# Patient Record
Sex: MALE | Race: BLACK OR AFRICAN AMERICAN
[De-identification: names, ages, dates, MRNs, and addresses within clinical notes are randomized per-mention and may not be internally consistent; named-entity substitution may affect disease eponyms.]

---

## 2018-12-10 ENCOUNTER — HOSPITAL ENCOUNTER (OUTPATIENT)
Dept: HOSPITAL 47 - EC | Age: 38
Setting detail: OBSERVATION
LOS: 1 days | Discharge: HOME | End: 2018-12-11
Attending: FAMILY MEDICINE | Admitting: FAMILY MEDICINE
Payer: COMMERCIAL

## 2018-12-10 DIAGNOSIS — E66.9: ICD-10-CM

## 2018-12-10 DIAGNOSIS — R42: ICD-10-CM

## 2018-12-10 DIAGNOSIS — R07.89: Primary | ICD-10-CM

## 2018-12-10 LAB
ALBUMIN SERPL-MCNC: 4.5 G/DL (ref 3.5–5)
ALP SERPL-CCNC: 42 U/L (ref 38–126)
ALT SERPL-CCNC: 35 U/L (ref 21–72)
ANION GAP SERPL CALC-SCNC: 10 MMOL/L
APTT BLD: 28.6 SEC (ref 22–30)
AST SERPL-CCNC: 33 U/L (ref 17–59)
BASOPHILS # BLD AUTO: 0 K/UL (ref 0–0.2)
BASOPHILS NFR BLD AUTO: 0 %
BUN SERPL-SCNC: 18 MG/DL (ref 9–20)
CALCIUM SPEC-MCNC: 9.6 MG/DL (ref 8.4–10.2)
CHLORIDE SERPL-SCNC: 102 MMOL/L (ref 98–107)
CK SERPL-CCNC: 813 U/L (ref 55–170)
CO2 SERPL-SCNC: 27 MMOL/L (ref 22–30)
D DIMER PPP FEU-MCNC: 0.27 MG/L FEU (ref ?–0.6)
EOSINOPHIL # BLD AUTO: 0.4 K/UL (ref 0–0.7)
EOSINOPHIL NFR BLD AUTO: 5 %
ERYTHROCYTE [DISTWIDTH] IN BLOOD BY AUTOMATED COUNT: 4.62 M/UL (ref 4.3–5.9)
ERYTHROCYTE [DISTWIDTH] IN BLOOD: 13.2 % (ref 11.5–15.5)
GLUCOSE SERPL-MCNC: 103 MG/DL (ref 74–99)
HCT VFR BLD AUTO: 42 % (ref 39–53)
HGB BLD-MCNC: 13.6 GM/DL (ref 13–17.5)
INR PPP: 0.9 (ref ?–1.2)
LYMPHOCYTES # SPEC AUTO: 3.2 K/UL (ref 1–4.8)
LYMPHOCYTES NFR SPEC AUTO: 36 %
MAGNESIUM SPEC-SCNC: 2 MG/DL (ref 1.6–2.3)
MCH RBC QN AUTO: 29.5 PG (ref 25–35)
MCHC RBC AUTO-ENTMCNC: 32.4 G/DL (ref 31–37)
MCV RBC AUTO: 91 FL (ref 80–100)
MONOCYTES # BLD AUTO: 0.6 K/UL (ref 0–1)
MONOCYTES NFR BLD AUTO: 6 %
NEUTROPHILS # BLD AUTO: 4.4 K/UL (ref 1.3–7.7)
NEUTROPHILS NFR BLD AUTO: 50 %
PLATELET # BLD AUTO: 332 K/UL (ref 150–450)
POTASSIUM SERPL-SCNC: 3.9 MMOL/L (ref 3.5–5.1)
PROT SERPL-MCNC: 7.7 G/DL (ref 6.3–8.2)
PT BLD: 9.7 SEC (ref 9–12)
SODIUM SERPL-SCNC: 139 MMOL/L (ref 137–145)
TROPONIN I SERPL-MCNC: <0.012 NG/ML (ref 0–0.03)
WBC # BLD AUTO: 8.8 K/UL (ref 3.8–10.6)

## 2018-12-10 PROCEDURE — 93306 TTE W/DOPPLER COMPLETE: CPT

## 2018-12-10 PROCEDURE — 36415 COLL VENOUS BLD VENIPUNCTURE: CPT

## 2018-12-10 PROCEDURE — 82553 CREATINE MB FRACTION: CPT

## 2018-12-10 PROCEDURE — 85379 FIBRIN DEGRADATION QUANT: CPT

## 2018-12-10 PROCEDURE — 85025 COMPLETE CBC W/AUTO DIFF WBC: CPT

## 2018-12-10 PROCEDURE — 82550 ASSAY OF CK (CPK): CPT

## 2018-12-10 PROCEDURE — 84484 ASSAY OF TROPONIN QUANT: CPT

## 2018-12-10 PROCEDURE — 85610 PROTHROMBIN TIME: CPT

## 2018-12-10 PROCEDURE — 83735 ASSAY OF MAGNESIUM: CPT

## 2018-12-10 PROCEDURE — 93005 ELECTROCARDIOGRAM TRACING: CPT

## 2018-12-10 PROCEDURE — 71046 X-RAY EXAM CHEST 2 VIEWS: CPT

## 2018-12-10 PROCEDURE — 80053 COMPREHEN METABOLIC PANEL: CPT

## 2018-12-10 PROCEDURE — 96360 HYDRATION IV INFUSION INIT: CPT

## 2018-12-10 PROCEDURE — 83880 ASSAY OF NATRIURETIC PEPTIDE: CPT

## 2018-12-10 PROCEDURE — 99285 EMERGENCY DEPT VISIT HI MDM: CPT

## 2018-12-10 PROCEDURE — 93351 STRESS TTE COMPLETE: CPT

## 2018-12-10 PROCEDURE — 85730 THROMBOPLASTIN TIME PARTIAL: CPT

## 2018-12-10 NOTE — XR
EXAMINATION TYPE: XR chest 2V

 

DATE OF EXAM: 12/10/2018

 

COMPARISON: NONE

 

HISTORY: Chest pain

 

TECHNIQUE:  Frontal and lateral views of the chest are obtained.

 

FINDINGS:  Heart and mediastinum are normal. Lungs are clear. Diaphragm is normal. Bony thorax appear
s normal.

 

IMPRESSION:  Normal chest.

## 2018-12-10 NOTE — ED
General Adult HPI





- General


Chief complaint: Chest Pain


Stated complaint: Chest tightness, Dizziness


Time Seen by Provider: 12/10/18 21:18


Source: patient, family, RN notes reviewed, old records reviewed


Mode of arrival: ambulatory


Limitations: no limitations





- History of Present Illness


Initial comments: 





38 -year-old male with no past medical history presents for evaluation of chest 

tightness and lightheadedness.  Patient states that he has been dealing with 

some upper anterior chest tightness which is intermittent over the past several 

months.  He was seen at an outside hospital and received an EKG.  Tonight while 

exercising he developed some upper chest tightness and became lightheaded.  

Denies nausea or vomiting.  Denies radiating chest pain.  Denies any headache 

or focal numbness or weakness.  No significant past medical history.  No 

history diabetes, nonsmoker.





- Related Data


 Home Medications











 Medication  Instructions  Recorded  Confirmed


 


No Known Home Medications  12/10/18 12/10/18











 Allergies











Allergy/AdvReac Type Severity Reaction Status Date / Time


 


No Known Allergies Allergy   Verified 12/10/18 21:31














Review of Systems


ROS Statement: 


Those systems with pertinent positive or pertinent negative responses have been 

documented in the HPI.





ROS Other: All systems not noted in ROS Statement are negative.





Past Medical History


Past Medical History: No Reported History


History of Any Multi-Drug Resistant Organisms: None Reported


Past Surgical History: Unable to Obtain


Past Psychological History: No Psychological Hx Reported


Smoking Status: Never smoker


Past Alcohol Use History: None Reported


Past Drug Use History: None Reported





General Exam


Limitations: no limitations


General appearance: alert, in no apparent distress


Head exam: Present: atraumatic, normocephalic


Eye exam: Present: normal appearance, PERRL, EOMI


ENT exam: Present: normal exam


Neck exam: Present: normal inspection.  Absent: tenderness, meningismus


Respiratory exam: Present: normal lung sounds bilaterally.  Absent: respiratory 

distress, wheezes


Cardiovascular Exam: Present: regular rate, normal rhythm


GI/Abdominal exam: Present: soft.  Absent: distended, tenderness, guarding


Extremities exam: Present: normal inspection, normal capillary refill.  Absent: 

pedal edema


Back exam: Present: normal inspection, full ROM


Neurological exam: Present: alert, oriented X3, CN II-XII intact.  Absent: 

motor sensory deficit


Psychiatric exam: Present: normal affect, normal mood


Skin exam: Present: warm, dry, intact.  Absent: cyanosis, diaphoretic





Course


 Vital Signs











  12/10/18 12/10/18 12/10/18





  21:13 22:00 22:51


 


Temperature 98.3 F  


 


Pulse Rate 83 74 77


 


Respiratory 20 16 15





Rate   


 


Blood Pressure 150/89 140/89 141/87


 


O2 Sat by Pulse 100 100 100





Oximetry   














EKG Findings





- EKG Comments:


EKG Findings:: EKG: Normal sinus rhythm, ventricular rate 71, DC interval 158, 

QRS duration 88, , there is Q-wave and T-wave inversion in lead 3, S-

wave in lead 1.  No ST segment changes.





Medical Decision Making





- Medical Decision Making





38 -year-old male presenting with intermittent chest tightness.  Patient was 

sent in by recommendations of his primary care physician.  Dr. Camargo is able to 

evaluate the patient in the emergency department.





EKG is normal sinus with Q-wave and T-wave inversion in lead 3, may be normal 

variant.  CBC, CMP are within normal limits, d-dimer is negative, initial 

troponin negative.  Patient will be admitted to Dr. Camargo.  Recommends echo and 

stress echo at this time.  Cardiology will be placed on consult.





- Lab Data


Result diagrams: 


 12/10/18 21:45





 12/10/18 21:45


 Lab Results











  12/10/18 12/10/18 12/10/18 Range/Units





  21:45 21:45 21:45 


 


WBC   8.8   (3.8-10.6)  k/uL


 


RBC   4.62   (4.30-5.90)  m/uL


 


Hgb   13.6   (13.0-17.5)  gm/dL


 


Hct   42.0   (39.0-53.0)  %


 


MCV   91.0   (80.0-100.0)  fL


 


MCH   29.5   (25.0-35.0)  pg


 


MCHC   32.4   (31.0-37.0)  g/dL


 


RDW   13.2   (11.5-15.5)  %


 


Plt Count   332   (150-450)  k/uL


 


Neutrophils %   50   %


 


Lymphocytes %   36   %


 


Monocytes %   6   %


 


Eosinophils %   5   %


 


Basophils %   0   %


 


Neutrophils #   4.4   (1.3-7.7)  k/uL


 


Lymphocytes #   3.2   (1.0-4.8)  k/uL


 


Monocytes #   0.6   (0-1.0)  k/uL


 


Eosinophils #   0.4   (0-0.7)  k/uL


 


Basophils #   0.0   (0-0.2)  k/uL


 


PT     (9.0-12.0)  sec


 


INR     (<1.2)  


 


APTT     (22.0-30.0)  sec


 


D-Dimer     (<0.60)  mg/L FEU


 


Sodium    139  (137-145)  mmol/L


 


Potassium    3.9  (3.5-5.1)  mmol/L


 


Chloride    102  ()  mmol/L


 


Carbon Dioxide    27  (22-30)  mmol/L


 


Anion Gap    10  mmol/L


 


BUN    18  (9-20)  mg/dL


 


Creatinine    1.11  (0.66-1.25)  mg/dL


 


Est GFR (CKD-EPI)AfAm    >90  (>60 ml/min/1.73 sqM)  


 


Est GFR (CKD-EPI)NonAf    85  (>60 ml/min/1.73 sqM)  


 


Glucose    103 H  (74-99)  mg/dL


 


Calcium    9.6  (8.4-10.2)  mg/dL


 


Magnesium    2.0  (1.6-2.3)  mg/dL


 


Total Bilirubin    0.2  (0.2-1.3)  mg/dL


 


AST    33  (17-59)  U/L


 


ALT    35  (21-72)  U/L


 


Alkaline Phosphatase    42  ()  U/L


 


Total Creatine Kinase  813 H    ()  U/L


 


CK-MB (CK-2)  4.5 H    (0.0-2.4)  ng/mL


 


CK-MB (CK-2) Rel Index  0.6    


 


Troponin I  <0.012    (0.000-0.034)  ng/mL


 


NT-Pro-B Natriuret Pep     pg/mL


 


Total Protein    7.7  (6.3-8.2)  g/dL


 


Albumin    4.5  (3.5-5.0)  g/dL














  12/10/18 12/10/18 Range/Units





  21:45 21:45 


 


WBC    (3.8-10.6)  k/uL


 


RBC    (4.30-5.90)  m/uL


 


Hgb    (13.0-17.5)  gm/dL


 


Hct    (39.0-53.0)  %


 


MCV    (80.0-100.0)  fL


 


MCH    (25.0-35.0)  pg


 


MCHC    (31.0-37.0)  g/dL


 


RDW    (11.5-15.5)  %


 


Plt Count    (150-450)  k/uL


 


Neutrophils %    %


 


Lymphocytes %    %


 


Monocytes %    %


 


Eosinophils %    %


 


Basophils %    %


 


Neutrophils #    (1.3-7.7)  k/uL


 


Lymphocytes #    (1.0-4.8)  k/uL


 


Monocytes #    (0-1.0)  k/uL


 


Eosinophils #    (0-0.7)  k/uL


 


Basophils #    (0-0.2)  k/uL


 


PT  9.7   (9.0-12.0)  sec


 


INR  0.9   (<1.2)  


 


APTT  28.6   (22.0-30.0)  sec


 


D-Dimer  0.27   (<0.60)  mg/L FEU


 


Sodium    (137-145)  mmol/L


 


Potassium    (3.5-5.1)  mmol/L


 


Chloride    ()  mmol/L


 


Carbon Dioxide    (22-30)  mmol/L


 


Anion Gap    mmol/L


 


BUN    (9-20)  mg/dL


 


Creatinine    (0.66-1.25)  mg/dL


 


Est GFR (CKD-EPI)AfAm    (>60 ml/min/1.73 sqM)  


 


Est GFR (CKD-EPI)NonAf    (>60 ml/min/1.73 sqM)  


 


Glucose    (74-99)  mg/dL


 


Calcium    (8.4-10.2)  mg/dL


 


Magnesium    (1.6-2.3)  mg/dL


 


Total Bilirubin    (0.2-1.3)  mg/dL


 


AST    (17-59)  U/L


 


ALT    (21-72)  U/L


 


Alkaline Phosphatase    ()  U/L


 


Total Creatine Kinase    ()  U/L


 


CK-MB (CK-2)    (0.0-2.4)  ng/mL


 


CK-MB (CK-2) Rel Index    


 


Troponin I    (0.000-0.034)  ng/mL


 


NT-Pro-B Natriuret Pep   13  pg/mL


 


Total Protein    (6.3-8.2)  g/dL


 


Albumin    (3.5-5.0)  g/dL














Disposition


Clinical Impression: 


 Chest pain





Disposition: ADMITTED AS IP TO THIS HOSP


Condition: Stable


Is patient prescribed a controlled substance at d/c from ED?: No


Referrals: 


Vick Camargo MD [Primary Care Provider] - 1-2 days


Decision to Admit Reason: Admit from EC


Decision Date: 12/10/18


Decision Time: 23:19

## 2018-12-11 VITALS — RESPIRATION RATE: 18 BRPM

## 2018-12-11 VITALS — TEMPERATURE: 98.5 F | HEART RATE: 69 BPM | SYSTOLIC BLOOD PRESSURE: 113 MMHG | DIASTOLIC BLOOD PRESSURE: 71 MMHG

## 2018-12-11 LAB
CK SERPL-CCNC: 737 U/L (ref 55–170)
TROPONIN I SERPL-MCNC: <0.012 NG/ML (ref 0–0.03)

## 2018-12-11 NOTE — P.DS
Providers


Date of admission: 


12/10/18 23:41





Expected date of discharge: 12/11/18


Attending physician: 


Vick Camargo





Consults: 





 





12/10/18 23:17


Consult Physician Routine 


   Consulting Provider: Navneet Ortiz


   Consult Reason/Comments: CP


   Do you want consulting provider notified?: Yes











Primary care physician: 


Vick Camargo





Hospital Course: 





38-year-old well-developed male was admitted to the emergency room for 

evaluation of chest pain.  Patient had a consultation with cardiology 

cardiology cleared for discharge with the negative stress test





Assessment


Chest pain atypical troponins negative stress test negative


Negative health history





Plan


Follow-up with family physician Dr. Vick Camargo


Patient Condition at Discharge: Stable





Plan - Discharge Summary


Discharge Rx Participant: No


New Discharge Prescriptions: 


No Action


   No Known Home Medications 


Discharge Medication List





No Known Home Medications  12/10/18 [History]








Follow up Appointment(s)/Referral(s): 


Vick Camargo MD [Primary Care Provider] - 1-2 days


Discharge Disposition: HOME SELF-CARE

## 2018-12-11 NOTE — ECHOS
STRESS ECHOCARDIOGRAM



DATE OF SERVICE:

12/11/2018



INDICATIONS:

Chest pain.



MEDICATIONS:



BASELINE HEART RATE:

68



BASELINE BLOOD PRESSURE:

131/70



MAXIMUM HEART RATE:

164



MAXIMUM BLOOD PRESSURE:

182/100



85% MPHR:

156



100% MPHR:

184



METS:

12.1



MAXIMUM STAGE REACHED:

IV



TOTAL EXERCISE TIME:

11 minutes



CLINICAL INFORMATION:

Baseline rhythm is sinus mechanism, rate 68, normal axis and intervals, RSR prime.

Baseline blood pressure 131/70 mmHg. Patient exercised on Pavan protocol for 11

minutes, reaching peak rate 164 beats per minute, which is equal to 89% maximum

predicted heart rate.  Peak blood pressure 182/100 mmHg. Test was terminated secondary

to fatigue. There was no chest pain. Electrocardiograph monitoring revealed no evidence

of diagnostic ischemic ST deviation.



Baseline echocardiogram revealed normal wall thickening and motion. At peak exercise,

there was normal wall motion augmentation with no hypokinesis or dyskinesis.



CONCLUSION:

1. Good exercise tolerance with normal electrocardiograph response to exercise.

2. Normal stress echocardiogram with no evidence of stress induced ischemia.





MMODL / IJN: [id number] / Job#: [id number]

## 2018-12-11 NOTE — P.HPIM
History of Present Illness





38-year-old male developed chest pain after working out in the gym.  Patient is 

a  also does heavy lifting with walking and lifting packages.  

Patient to have a cardiology evaluation





Review of Systems


Cardiovascular: Reports chest pain





Past Medical History


Past Medical History: No Reported History


History of Any Multi-Drug Resistant Organisms: None Reported


Past Surgical History: No Surgical Hx Reported


Additional Past Anesthesia/Blood Transfusion Reaction / Comment(s): Pt has 

never had anesthesia


Smoking Status: Never smoker





- Past Family History


  ** Father


Family Medical History: No Reported History


Additional Family Medical History / Comment(s): Healthy





  ** Mother


Family Medical History: No Reported History


Additional Family Medical History / Comment(s): Healthy





Medications and Allergies


 Home Medications











 Medication  Instructions  Recorded  Confirmed  Type


 


No Known Home Medications  12/10/18 12/10/18 History











 Allergies











Allergy/AdvReac Type Severity Reaction Status Date / Time


 


No Known Allergies Allergy   Verified 12/10/18 21:31














Physical Exam


Vitals: 


 Vital Signs











  Temp Pulse Pulse Resp BP BP Pulse Ox


 


 12/11/18 11:24  98.5 F   69  18   113/71  99


 


 12/11/18 07:30  97.5 F L   62  18   149/80  100


 


 12/11/18 05:00   60   17  120/79   97


 


 12/11/18 04:00   49 L   13  116/71   98


 


 12/11/18 03:00   53 L   13  120/63   97


 


 12/11/18 02:00   66   18  119/68   96


 


 12/11/18 01:00   66   16  125/65   97


 


 12/11/18 00:00   75   12  124/72   97


 


 12/10/18 23:56   74   18  124/72   97


 


 12/10/18 22:51   77   15  141/87   100


 


 12/10/18 22:00   74   16  140/89   100


 


 12/10/18 21:13  98.3 F  83   20  150/89   100








 Intake and Output











 12/11/18 12/11/18 12/11/18





 06:59 14:59 22:59


 


Intake Total  600 


 


Balance  600 


 


Intake:   


 


  Oral  600 


 


Other:   


 


  Voiding Method  Toilet 


 


  # Voids   1


 


  Weight  110.7 kg 














- Constitutional


General appearance: average body habitus





- EENT


Eyes: PERRLA


Ears: bilateral: normal





- Neck


Neck: normal ROM





- Respiratory


Respiratory: negative: CTA





- Cardiovascular


Rhythm: regular





- Gastrointestinal


General gastrointestinal: soft





- Neurologic


Neurologic: CNII-XII intact





- Musculoskeletal


Musculoskeletal: gait normal





- Psychiatric


Psychiatric: A&O x's 3, appropriate affect, intact judgment & insight





Results


CBC & Chem 7: 


 12/10/18 21:45





 12/10/18 21:45


Labs: 


 Abnormal Lab Results - Last 24 Hours (Table)











  12/10/18 12/10/18 12/11/18 Range/Units





  21:45 21:45 03:21 


 


Glucose   103 H   (74-99)  mg/dL


 


Total Creatine Kinase  813 H   737 H  ()  U/L


 


CK-MB (CK-2)  4.5 H   3.7 H  (0.0-2.4)  ng/mL











Chest x-ray: report reviewed





Thrombosis Risk Factor Assmnt





- Choose All That Apply


Any of the Below Risk Factors Present?: Yes


Each Factor Represents 1 point: Obesity (BMI >25)


Other Risk Factors: No


Other congenital or acquired thrombophilia - If yes, enter type in comment: No


Thrombosis Risk Factor Assessment Total Risk Factor Score: 1


Thrombosis Risk Factor Assessment Level: Low Risk





Assessment and Plan


Plan: 





Assessment


Atypical chest pain troponins negative EKG normal sinus rhythm


Negative stress test discharge per cardiology





Negative health history





Plan


Cardiology consultation


Discharge home follow-up with family physician Dr.Aaron Camargo

## 2018-12-11 NOTE — ECHOF
Referral Reason:cp



MEASUREMENTS

--------

HEIGHT: 182.9 cm

WEIGHT: 108.9 kg

BP: 120/79

RVIDd:   3.2 cm     (< 3.3)

IVSd:   1.2 cm     (0.6 - 1.1)

LVIDd:   4.5 cm     (3.9 - 5.3)

LVPWd:   1.2 cm     (0.6 - 1.1)

IVSs:   1.7 cm

LVIDs:   3.1 cm

LVPWs:   1.8 cm

LA Diam:   3.4 cm     (2.7 - 3.8)

LAESV Index (A-L):   24.31 ml/m

Ao Diam:   2.9 cm     (2.0 - 3.7)

AV Cusp:   2.0 cm     (1.5 - 2.6)

MV EXCURSION:   21.150 mm     (> 18.000)

MV EF SLOPE:   135 mm/s     (70 - 150)

EPSS:   0.3 cm

MV E Keith:   1.08 m/s

MV DecT:   217 ms

MV A Keith:   0.83 m/s

MV E/A Ratio:   1.31 

RAP:   5.00 mmHg

RVSP:   22.27 mmHg







FINDINGS

--------

Sinus rhythm.

This was a technically good study.

The left ventricular size is normal.   There is borderline concentric left ventricular hypertrophy.  
 Overall left ventricular systolic function is normal with, an EF between 55 - 60 %.

The right ventricle is normal in size.

Normal LA  size by volume 22+/-6 ml/m2.

The right atrium is normal in size.

The aortic valve is trileaflet, and appears structurally normal. No aortic stenosis or regurgitation.


The mitral valve is normal.

Mild tricuspid regurgitation present.   Right ventricular systolic pressure is normal at < 35 mmHg.  
 The right ventricular systolic pressure, as measured by Doppler, is 22.27mmHg.

Trace/mild (physiologic)  pulmonic regurgitation.

The aortic root size is normal.

Normal inferior vena cava with normal inspiratory collapse consistent with estimated right atrial pre
ssure of  5 mmHg.

There is no pericardial effusion.



CONCLUSIONS

--------

1. Sinus rhythm.

2. This was a technically good study.

3. The left ventricular size is normal.

4. There is borderline concentric left ventricular hypertrophy.

5. Overall left ventricular systolic function is normal with, an EF between 55 - 60 %.

6. Normal LA size by volume 22+/-6 ml/m2.

7. The aortic valve is trileaflet, and appears structurally normal. No aortic stenosis or regurgitati
on.

8. The mitral valve is normal.

9. Mild tricuspid regurgitation present.

10. Right ventricular systolic pressure is normal at < 35 mmHg.

11. Trace/mild (physiologic)  pulmonic regurgitation.

12. The aortic root size is normal.

13. Normal inferior vena cava with normal inspiratory collapse consistent with estimated right atrial
 pressure of  5 mmHg.

14. There is no pericardial effusion.





SONOGRAPHER: Jen Rodríguez RDCS

## 2018-12-11 NOTE — P.CRDCN
History of Present Illness


History of present illness: 





This is a pleasant 38-year-old  male with no significant past 

medical history. He denies history of coronary artery disease, hypertension, 

dyslipidemia and is a nonsmoker.  He denies family history of premature 

coronary artery disease.  He has never seen a cardiologist for any reason.  We'

ve been asked to see him in consultation for symptoms of chest discomfort.  He 

states yesterday while he was exercising at the gym on the treadmill he got off 

the treadmill and he started feeling a tightness in his chest in the midsternal 

region.  This was associated with a dizzy sensation.  He denies radiation of 

the pain to the arm, back, neck or jaw.  He denies shortness of breath, 

diaphoresis, nausea, vomiting or palpitations.  The symptoms persisted for 

approximately 10-15 minutes and ultimately subsided on their own.  He states he 

has felt symptoms like this intermittently in the past and has been diagnosed 

with costochondritis.  He has never undergone any sort of stress testing in the 

past.  At the time of my exam he is seen resting comfortably in bed in no acute 

distress.  He denies any further symptoms of chest discomfort.





EKG reveals sinus mechanism with no acute ST or T wave abnormalities noted.


Chest x-ray is negative for an acute cardiopulmonary process.


Laboratory data reviewed, WBC 8.8, hemoglobin 13.6, platelets 332, d-dimer 0.27

, sodium 139, potassium 3.9, creatinine 1.11, magnesium 2.0, cardiac enzymes 

negative 2,  and 737, NT proBNP 13.


He takes no daily cardiac medications. 





At the time of my exam:


CONSTITUTIONAL: Denies fever. Denies chills.


EYES: Denies blurred vision. Denies vision changes. Denies eye pain.


EARS, NOSE, MOUTH & THROAT: Denies headache. Denies sore throat. Denies ear 

pain.


CARDIOVASCULAR: Denies chest pain. Denies shortness of breath. Denies 

orthopnea. Denies PND. Denies palpitations.


RESPIRATORY: Denies cough. 


GASTROINTESTINAL: Denies abdominal pain. Denies diarrhea. Denies constipation. 

Denies nausea. Denies vomiting.


MUSCULOSKELETAL: Denies myalgias.


INTEGUMENTARY: Denies pruitis. Denies rash.


NEUROLOGIC: Denies numbness. Denies tingling. Denies weakness.


PSYCHIATRIC: Denies anxiety. Denies depression.


ENDOCRINE: Denies fatigue. Denies weight change. Denies polydipsia. Denies 

polyurina.


GENITOURINARY: Denies burning, hematuria or urgency with micturation.


HEMATOLOGIC: Denies history of anemia. Denies bleeding. 





Blood pressure 149/80 heart rate 62 afebrile maintaining oxygen saturation on 

room air


GENERAL: This is a 38-year-old -American male in no apparent distress at 

the time of my examination.


HEENT: Head is atraumatic, normocephalic. Pupils are equal, round. Sclerae 

anicteric. Conjunctivae are clear. Mucous membranes of the mouth are moist. 

Neck is supple. There is no jugular venous distention. No carotid bruit is 

heard.


LUNGS: Clear to auscultation no wheezes, rales or rhonchi. No chest wall 

tenderness is noted on palpation or with deep breathing.


HEART: Regular rate and rhythm without murmurs, rubs or gallops. S1 and S2 

heard.


ABDOMEN: Soft, nontender. Bowel sounds are heard. No organomegaly noted.


EXTREMITIES: No evidence of peripheral edema and no calf tenderness noted.


VASCULAR: Radial and dorsalis pedis pulses palpated, no evidence of clubbing.  


NEUROLOGIC: Patient is awake, alert and oriented x3.


 


ASSESSMENT


Chest pain, atypical.  An acute coronary event has been ruled out with no EKG 

evidence of ischemia and negative cardiac enzymes.





PLAN


An acute coronary event has been ruled out with no EKG evidence of ischemia and 

negative cardiac enzymes.


Stress test and echocardiogram have been ordered by the emergency department 

and we agree with proceeding. 


If stress test is negative he is stable from a cardiac perspective. 


Thank you kindly for this consultation. 





Nurse Practitioner note has been reviewed, I agree with a documented findings 

and plan of care.  Patient was seen and examined.








Past Medical History


Past Medical History: No Reported History


History of Any Multi-Drug Resistant Organisms: None Reported


Past Surgical History: No Surgical Hx Reported


Additional Past Anesthesia/Blood Transfusion Reaction / Comment(s): Pt has 

never had anesthesia


Smoking Status: Never smoker





- Past Family History


  ** Father


Family Medical History: No Reported History


Additional Family Medical History / Comment(s): Healthy





  ** Mother


Family Medical History: No Reported History


Additional Family Medical History / Comment(s): Healthy





Medications and Allergies


 Home Medications











 Medication  Instructions  Recorded  Confirmed  Type


 


No Known Home Medications  12/10/18 12/10/18 History











 Allergies











Allergy/AdvReac Type Severity Reaction Status Date / Time


 


No Known Allergies Allergy   Verified 12/10/18 21:31














Physical Exam


Vitals: 


 Vital Signs











  Temp Pulse Pulse Resp BP BP Pulse Ox


 


 12/11/18 07:30  97.5 F L   62  18   149/80  100


 


 12/11/18 05:00   60   17  120/79   97


 


 12/11/18 04:00   49 L   13  116/71   98


 


 12/11/18 03:00   53 L   13  120/63   97


 


 12/11/18 02:00   66   18  119/68   96


 


 12/11/18 01:00   66   16  125/65   97


 


 12/11/18 00:00   75   12  124/72   97


 


 12/10/18 23:56   74   18  124/72   97


 


 12/10/18 22:51   77   15  141/87   100


 


 12/10/18 22:00   74   16  140/89   100


 


 12/10/18 21:13  98.3 F  83   20  150/89   100








 Intake and Output











 12/10/18 12/11/18 12/11/18





 22:59 06:59 14:59


 


Other:   


 


  Voiding Method   Toilet


 


  Weight 108.862 kg  110.7 kg














Results





 12/10/18 21:45





 12/10/18 21:45


 Cardiac Enzymes











  12/10/18 12/10/18 12/11/18 Range/Units





  21:45 21:45 03:21 


 


AST   33   (17-59)  U/L


 


CK-MB (CK-2)  4.5 H   3.7 H  (0.0-2.4)  ng/mL


 


Troponin I  <0.012   <0.012  (0.000-0.034)  ng/mL








 Coagulation











  12/10/18 Range/Units





  21:45 


 


PT  9.7  (9.0-12.0)  sec


 


APTT  28.6  (22.0-30.0)  sec








 CBC











  12/10/18 Range/Units





  21:45 


 


WBC  8.8  (3.8-10.6)  k/uL


 


RBC  4.62  (4.30-5.90)  m/uL


 


Hgb  13.6  (13.0-17.5)  gm/dL


 


Hct  42.0  (39.0-53.0)  %


 


Plt Count  332  (150-450)  k/uL








 Comprehensive Metabolic Panel











  12/10/18 Range/Units





  21:45 


 


Sodium  139  (137-145)  mmol/L


 


Potassium  3.9  (3.5-5.1)  mmol/L


 


Chloride  102  ()  mmol/L


 


Carbon Dioxide  27  (22-30)  mmol/L


 


BUN  18  (9-20)  mg/dL


 


Creatinine  1.11  (0.66-1.25)  mg/dL


 


Glucose  103 H  (74-99)  mg/dL


 


Calcium  9.6  (8.4-10.2)  mg/dL


 


AST  33  (17-59)  U/L


 


ALT  35  (21-72)  U/L


 


Alkaline Phosphatase  42  ()  U/L


 


Total Protein  7.7  (6.3-8.2)  g/dL


 


Albumin  4.5  (3.5-5.0)  g/dL








 Current Medications











Generic Name Dose Route Start Last Admin





  Trade Name Freq  PRN Reason Stop Dose Admin


 


Acetaminophen  650 mg  12/10/18 23:16  





  Tylenol Tab  PO   





  Q6HR PRN   





  Mild Pain or Fever > 100.5   





     





     





     


 


Naloxone HCl  0.2 mg  12/10/18 23:16  





  Narcan  IV   





  Q2M PRN   





  Opioid Reversal   





     





     





     








 Intake and Output











 12/10/18 12/11/18 12/11/18





 22:59 06:59 14:59


 


Other:   


 


  Voiding Method   Toilet


 


  Weight 108.862 kg  110.7 kg








 Patient Weight











 12/12/18





 06:59


 


Weight 110.7 kg








 





 12/10/18 21:45 





 12/10/18 21:45

## 2021-07-25 ENCOUNTER — HOSPITAL ENCOUNTER (EMERGENCY)
Dept: HOSPITAL 47 - EC | Age: 41
Discharge: HOME | End: 2021-07-25
Payer: COMMERCIAL

## 2021-07-25 VITALS
DIASTOLIC BLOOD PRESSURE: 74 MMHG | HEART RATE: 18 BPM | RESPIRATION RATE: 64 BRPM | TEMPERATURE: 98.2 F | SYSTOLIC BLOOD PRESSURE: 118 MMHG

## 2021-07-25 DIAGNOSIS — R07.9: Primary | ICD-10-CM

## 2021-07-25 PROCEDURE — 93005 ELECTROCARDIOGRAM TRACING: CPT

## 2021-07-25 PROCEDURE — 71046 X-RAY EXAM CHEST 2 VIEWS: CPT

## 2021-07-25 PROCEDURE — 99285 EMERGENCY DEPT VISIT HI MDM: CPT

## 2021-07-25 NOTE — ED
General Adult HPI





- General


Chief complaint: Chest Pain


Stated complaint: Chest Pain


Time Seen by Provider: 07/25/21 20:22


Source: patient


Mode of arrival: ambulatory


Limitations: no limitations





- History of Present Illness


Initial comments: 


Dictation was produced using dragon dictation software. please excuse any 

grammatical, word or spelling errors. 











Chief Complaint: 40-year-old male presents with chest pain





History of Present Illness: She is 40-year-old male who presents today with 

chest pain.  His pain is exacerbated whenever he tries to swallow solids or 

liquids.  He does not feel like he is having issues with dysphagia but he does 

feel symptoms when he swallows.  He states that it's sharp in his mid chest.  

His symptoms not exacerbated by exertion.  No associated diaphoresis.  Does not 

radiate to shoulders or jaw.  Patient has no history of coronary artery disease.

 Does not smoke.  No medical problems.  No family history of cardiac disease.








The ROS documented in this emergency department record has been reviewed and 

confirmed by me.  Those systems with pertinent positive or negative responses 

have been documented in the HPI.  All other systems are other negative and/or 

noncontributory.








PHYSICAL EXAM:


General Impression: Alert and oriented x3, not in acute distress, not drooling


HEENT: Normocephalic atraumatic, extra-ocular movements intact, pupils equal and

reactive to light bilaterally, mucous membranes moist.


Cardiovascular: Heart regular rate and rhythm


Chest: Able to complete full sentences, no retractions, no tachypnea


Abdomen: abdomen soft, non-tender, non-distended, no organomegaly


Musculoskeletal: Pulses present and equal in all extremities, no peripheral 

edema


Motor:  no focal deficits noted


Neurological: CN II-XII grossly intact, no focal motor or sensory deficits noted


Skin: Intact with no visualized rashes


Psych: Normal affect and mood





ED course: 40-year-old male presents with odynophagia.  Symptoms are very 

atypical.  Since for ACS.  Signs upon arrival are within acceptable limits.





EKG interpretation: Ventricular rate 72, normal sinus rhythm, MI interval 152, 

QRS 82, . No MI prolongation, no QTC prolongation, no ST or T-wave 

changes noted.  Overall, this EKG is unremarkable





Two-view chest x-ray is unremarkable.  Patient reports relief after GI cocktail 

administration.  He does report some relief.  Patient advised to follow-up with 

primary care doctor for outpatient management of odynophagia.








- Related Data


                                  Previous Rx's











 Medication  Instructions  Recorded


 


Acetaminophen Tab [Tylenol] 650 mg PO Q6HR PRN  tab 12/11/18











                                    Allergies











Allergy/AdvReac Type Severity Reaction Status Date / Time


 


No Known Allergies Allergy   Verified 07/25/21 20:14














Review of Systems


ROS Statement: 


Those systems with pertinent positive or pertinent negative responses have been 

documented in the HPI.





ROS Other: All systems not noted in ROS Statement are negative.





Past Medical History


Past Medical History: No Reported History


History of Any Multi-Drug Resistant Organisms: None Reported


Past Surgical History: No Surgical Hx Reported


Additional Past Anesthesia/Blood Transfusion Reaction / Comment(s): Pt has never

 had anesthesia


Past Psychological History: No Psychological Hx Reported


Past Alcohol Use History: None Reported


Past Drug Use History: None Reported





- Past Family History


  ** Father


Family Medical History: No Reported History


Additional Family Medical History / Comment(s): Healthy





  ** Mother


Family Medical History: No Reported History


Additional Family Medical History / Comment(s): Healthy





General Exam


Limitations: no limitations





Course


                                   Vital Signs











  07/25/21





  20:14


 


Temperature 97.9 F


 


Pulse Rate 71


 


Respiratory 16





Rate 


 


Blood Pressure 129/84


 


O2 Sat by Pulse 97





Oximetry 














Disposition


Clinical Impression: 


 Chest pain





Disposition: HOME SELF-CARE


Condition: Good


Instructions (If sedation given, give patient instructions):  Chest Pain (ED)


Is patient prescribed a controlled substance at d/c from ED?: No


Referrals: 


Vick Camargo MD [Primary Care Provider] - 1-2 days

## 2021-07-25 NOTE — XR
EXAMINATION TYPE: XR chest 2V

 

DATE OF EXAM: 7/25/2021

 

COMPARISON: 12/10/2018

 

HISTORY: Chest pain

 

TECHNIQUE:

 

FINDINGS: Heart and mediastinum are normal. Lungs are clear. Diaphragm is normal. Bony thorax appears
 normal.

 

IMPRESSION: Normal chest. No change.

## 2022-07-07 ENCOUNTER — HOSPITAL ENCOUNTER (EMERGENCY)
Dept: HOSPITAL 47 - EC | Age: 42
Discharge: HOME | End: 2022-07-07
Payer: COMMERCIAL

## 2022-07-07 VITALS
SYSTOLIC BLOOD PRESSURE: 110 MMHG | RESPIRATION RATE: 20 BRPM | TEMPERATURE: 98 F | DIASTOLIC BLOOD PRESSURE: 70 MMHG | HEART RATE: 100 BPM

## 2022-07-07 DIAGNOSIS — R00.2: Primary | ICD-10-CM

## 2022-07-07 LAB
ALBUMIN SERPL-MCNC: 4.6 G/DL (ref 3.5–5)
ALP SERPL-CCNC: 49 U/L (ref 38–126)
ALT SERPL-CCNC: 51 U/L (ref 4–49)
ANION GAP SERPL CALC-SCNC: 8 MMOL/L
APTT BLD: 27.6 SEC (ref 22–30)
AST SERPL-CCNC: 56 U/L (ref 17–59)
BASOPHILS # BLD AUTO: 0 K/UL (ref 0–0.2)
BASOPHILS NFR BLD AUTO: 0 %
BUN SERPL-SCNC: 16 MG/DL (ref 9–20)
CALCIUM SPEC-MCNC: 9.3 MG/DL (ref 8.4–10.2)
CHLORIDE SERPL-SCNC: 102 MMOL/L (ref 98–107)
CO2 SERPL-SCNC: 28 MMOL/L (ref 22–30)
EOSINOPHIL # BLD AUTO: 0.1 K/UL (ref 0–0.7)
EOSINOPHIL NFR BLD AUTO: 1 %
ERYTHROCYTE [DISTWIDTH] IN BLOOD BY AUTOMATED COUNT: 4.69 M/UL (ref 4.3–5.9)
ERYTHROCYTE [DISTWIDTH] IN BLOOD: 13.1 % (ref 11.5–15.5)
GLUCOSE SERPL-MCNC: 95 MG/DL (ref 74–99)
HCT VFR BLD AUTO: 44.4 % (ref 39–53)
HGB BLD-MCNC: 14.5 GM/DL (ref 13–17.5)
INR PPP: 0.9 (ref ?–1.2)
LYMPHOCYTES # SPEC AUTO: 0.9 K/UL (ref 1–4.8)
LYMPHOCYTES NFR SPEC AUTO: 9 %
MAGNESIUM SPEC-SCNC: 1.9 MG/DL (ref 1.6–2.3)
MCH RBC QN AUTO: 30.8 PG (ref 25–35)
MCHC RBC AUTO-ENTMCNC: 32.6 G/DL (ref 31–37)
MCV RBC AUTO: 94.5 FL (ref 80–100)
MONOCYTES # BLD AUTO: 0.2 K/UL (ref 0–1)
MONOCYTES NFR BLD AUTO: 2 %
NEUTROPHILS # BLD AUTO: 8.5 K/UL (ref 1.3–7.7)
NEUTROPHILS NFR BLD AUTO: 87 %
PLATELET # BLD AUTO: 358 K/UL (ref 150–450)
POTASSIUM SERPL-SCNC: 4.2 MMOL/L (ref 3.5–5.1)
PROT SERPL-MCNC: 7.7 G/DL (ref 6.3–8.2)
PT BLD: 10.1 SEC (ref 9–12)
SODIUM SERPL-SCNC: 138 MMOL/L (ref 137–145)
WBC # BLD AUTO: 9.7 K/UL (ref 3.8–10.6)

## 2022-07-07 PROCEDURE — 84443 ASSAY THYROID STIM HORMONE: CPT

## 2022-07-07 PROCEDURE — 80306 DRUG TEST PRSMV INSTRMNT: CPT

## 2022-07-07 PROCEDURE — 85610 PROTHROMBIN TIME: CPT

## 2022-07-07 PROCEDURE — 85730 THROMBOPLASTIN TIME PARTIAL: CPT

## 2022-07-07 PROCEDURE — 80053 COMPREHEN METABOLIC PANEL: CPT

## 2022-07-07 PROCEDURE — 93005 ELECTROCARDIOGRAM TRACING: CPT

## 2022-07-07 PROCEDURE — 84484 ASSAY OF TROPONIN QUANT: CPT

## 2022-07-07 PROCEDURE — 83735 ASSAY OF MAGNESIUM: CPT

## 2022-07-07 PROCEDURE — 71046 X-RAY EXAM CHEST 2 VIEWS: CPT

## 2022-07-07 PROCEDURE — 85025 COMPLETE CBC W/AUTO DIFF WBC: CPT

## 2022-07-07 PROCEDURE — 36415 COLL VENOUS BLD VENIPUNCTURE: CPT

## 2022-07-07 NOTE — ED
General Adult HPI





- General


Chief complaint: Arrhythmia/Palpitations


Stated complaint: High HR


Time Seen by Provider: 07/07/22 18:15


Source: patient, RN notes reviewed, old records reviewed


Mode of arrival: ambulatory


Limitations: no limitations





- History of Present Illness


Initial comments: 





This is a 41-year-old male who presents emergency Department complaining of 

heart palpitations.  Patient states he got off work a lot of things to his very 

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 

beats a minute and it made her very nervous he stated it lasted about an hour 

and a half and so he decided come the emergency department.  Patient came in was

107 beats a minute on his watch.  Patient denies any chest pain patient denies 

shortness of breath any diaphoretic episodes or nausea.  Patient denies any 

headache patient denies numbness weakness.  Patient denies any recent illness.  

Patient denies any recent fever chills or cough per patient denies abdominal 

pain patient denies vomiting or diarrhea.  Patient states aside from feeling his

heart racing and being a little anxious because of all the things that you he 

feels at his baseline.





- Related Data


                                Home Medications











 Medication  Instructions  Recorded  Confirmed


 


No Known Home Medications  07/07/22 07/07/22











                                    Allergies











Allergy/AdvReac Type Severity Reaction Status Date / Time


 


No Known Allergies Allergy   Verified 07/07/22 19:24














Review of Systems


ROS Statement: 


Those systems with pertinent positive or pertinent negative responses have been 

documented in the HPI.





ROS Other: All systems not noted in ROS Statement are negative.





Past Medical History


Past Medical History: No Reported History


History of Any Multi-Drug Resistant Organisms: None Reported


Past Surgical History: No Surgical Hx Reported


Additional Past Anesthesia/Blood Transfusion Reaction / Comment(s): Pt has never

had anesthesia


Past Psychological History: No Psychological Hx Reported


Smoking Status: Never smoker


Past Alcohol Use History: None Reported


Past Drug Use History: None Reported





- Past Family History


  ** Father


Family Medical History: No Reported History


Additional Family Medical History / Comment(s): Healthy





  ** Mother


Family Medical History: No Reported History


Additional Family Medical History / Comment(s): Healthy





General Exam





- General Exam Comments


Initial Comments: 





GENERAL:


Patient is well-developed and well-nourished.  Patient is nontoxic and well-

hydrated and is in no acute distress.





ENT:


Neck is soft and supple.  No significant lymphadenopathy is noted.  Oropharynx 

is clear.  Moist mucous membranes.  Neck has full range of motion without 

eliciting any pain.  There is no thyroid enlargement and no masses were felt.





EYES:


The sclera were anicteric and conjunctiva were pink and moist.  Extraocular 

movements were intact and pupils were equal round and reactive to light.  

Eyelids were unremarkable.





PULMONARY:


Unlabored respirations.  Good breath sounds bilaterally.  No audible rales 

rhonchi or wheezing was noted.





CARDIOVASCULAR:


There is a regular rate and rhythm without any murmurs gallops or rubs.  





ABDOMEN:


Soft and nontender with normal bowel sounds.  





SKIN:


Skin is clear with no lesions or rashes and otherwise unremarkable.





NEUROLOGIC:


Patient is alert and oriented x3.  Cranial nerves II through XII are grossly 

intact.  Motor and sensory are also intact.  Normal speech, volume and content. 

Symmetrical smile. 





MUSCULOSKELETAL:


Normal extremities with adequate strength and full range of motion.  No lower e

xtremity swelling or edema.  No calf tenderness.





LYMPHATICS:


No significant lymphadenopathy is noted





PSYCHIATRIC:


Patient seems mildly anxious


Limitations: no limitations





Course


                                   Vital Signs











  07/07/22





  18:12


 


Temperature 98.0 F


 


Pulse Rate 100


 


Respiratory 20





Rate 


 


Blood Pressure 110/70


 


O2 Sat by Pulse 97





Oximetry 














Medical Decision Making





- Medical Decision Making





EKG shows sinus rhythm at 90 bpm OR interval 260 QRS is a 70 QT interval 3:30 

QTC is 377.  Patient's EKG shows no ST segment elevation or depression.  Patient

has some inverted T waves in 3 and aVF.





Patient remained asymptomatic throughout the ED stay.





- Lab Data


Result diagrams: 


                                 07/07/22 18:38





                                 07/07/22 18:38


                                   Lab Results











  07/07/22 07/07/22 07/07/22 Range/Units





  18:38 18:38 18:38 


 


WBC  9.7    (3.8-10.6)  k/uL


 


RBC  4.69    (4.30-5.90)  m/uL


 


Hgb  14.5    (13.0-17.5)  gm/dL


 


Hct  44.4    (39.0-53.0)  %


 


MCV  94.5    (80.0-100.0)  fL


 


MCH  30.8    (25.0-35.0)  pg


 


MCHC  32.6    (31.0-37.0)  g/dL


 


RDW  13.1    (11.5-15.5)  %


 


Plt Count  358    (150-450)  k/uL


 


MPV  7.6    


 


Neutrophils %  87    %


 


Lymphocytes %  9    %


 


Monocytes %  2    %


 


Eosinophils %  1    %


 


Basophils %  0    %


 


Neutrophils #  8.5 H    (1.3-7.7)  k/uL


 


Lymphocytes #  0.9 L    (1.0-4.8)  k/uL


 


Monocytes #  0.2    (0-1.0)  k/uL


 


Eosinophils #  0.1    (0-0.7)  k/uL


 


Basophils #  0.0    (0-0.2)  k/uL


 


PT   10.1   (9.0-12.0)  sec


 


INR   0.9   (<1.2)  


 


APTT   27.6   (22.0-30.0)  sec


 


Sodium     (137-145)  mmol/L


 


Potassium     (3.5-5.1)  mmol/L


 


Chloride     ()  mmol/L


 


Carbon Dioxide     (22-30)  mmol/L


 


Anion Gap     mmol/L


 


BUN     (9-20)  mg/dL


 


Creatinine     (0.66-1.25)  mg/dL


 


Est GFR (CKD-EPI)AfAm     (>60 ml/min/1.73 sqM)  


 


Est GFR (CKD-EPI)NonAf     (>60 ml/min/1.73 sqM)  


 


Glucose     (74-99)  mg/dL


 


Calcium     (8.4-10.2)  mg/dL


 


Magnesium     (1.6-2.3)  mg/dL


 


Total Bilirubin     (0.2-1.3)  mg/dL


 


AST     (17-59)  U/L


 


ALT     (4-49)  U/L


 


Alkaline Phosphatase     ()  U/L


 


Troponin I     (0.000-0.034)  ng/mL


 


Total Protein     (6.3-8.2)  g/dL


 


Albumin     (3.5-5.0)  g/dL


 


Urine Opiates Screen    Not Detected  (NotDetected)  


 


Ur Oxycodone Screen    Not Detected  (NotDetected)  


 


Urine Methadone Screen    Not Detected  (NotDetected)  


 


Ur Propoxyphene Screen    Not Detected  (NotDetected)  


 


Ur Barbiturates Screen    Not Detected  (NotDetected)  


 


U Tricyclic Antidepress    Not Detected  (NotDetected)  


 


Ur Phencyclidine Scrn    Not Detected  (NotDetected)  


 


Ur Amphetamines Screen    Not Detected  (NotDetected)  


 


U Methamphetamines Scrn    Not Detected  (NotDetected)  


 


U Benzodiazepines Scrn    Not Detected  (NotDetected)  


 


Urine Cocaine Screen    Not Detected  (NotDetected)  


 


U Marijuana (THC) Screen    Not Detected  (NotDetected)  














  07/07/22 07/07/22 Range/Units





  18:38 18:38 


 


WBC    (3.8-10.6)  k/uL


 


RBC    (4.30-5.90)  m/uL


 


Hgb    (13.0-17.5)  gm/dL


 


Hct    (39.0-53.0)  %


 


MCV    (80.0-100.0)  fL


 


MCH    (25.0-35.0)  pg


 


MCHC    (31.0-37.0)  g/dL


 


RDW    (11.5-15.5)  %


 


Plt Count    (150-450)  k/uL


 


MPV    


 


Neutrophils %    %


 


Lymphocytes %    %


 


Monocytes %    %


 


Eosinophils %    %


 


Basophils %    %


 


Neutrophils #    (1.3-7.7)  k/uL


 


Lymphocytes #    (1.0-4.8)  k/uL


 


Monocytes #    (0-1.0)  k/uL


 


Eosinophils #    (0-0.7)  k/uL


 


Basophils #    (0-0.2)  k/uL


 


PT    (9.0-12.0)  sec


 


INR    (<1.2)  


 


APTT    (22.0-30.0)  sec


 


Sodium  138   (137-145)  mmol/L


 


Potassium  4.2   (3.5-5.1)  mmol/L


 


Chloride  102   ()  mmol/L


 


Carbon Dioxide  28   (22-30)  mmol/L


 


Anion Gap  8   mmol/L


 


BUN  16   (9-20)  mg/dL


 


Creatinine  0.91   (0.66-1.25)  mg/dL


 


Est GFR (CKD-EPI)AfAm  >90   (>60 ml/min/1.73 sqM)  


 


Est GFR (CKD-EPI)NonAf  >90   (>60 ml/min/1.73 sqM)  


 


Glucose  95   (74-99)  mg/dL


 


Calcium  9.3   (8.4-10.2)  mg/dL


 


Magnesium  1.9   (1.6-2.3)  mg/dL


 


Total Bilirubin  0.6   (0.2-1.3)  mg/dL


 


AST  56   (17-59)  U/L


 


ALT  51 H   (4-49)  U/L


 


Alkaline Phosphatase  49   ()  U/L


 


Troponin I   <0.012  (0.000-0.034)  ng/mL


 


Total Protein  7.7   (6.3-8.2)  g/dL


 


Albumin  4.6   (3.5-5.0)  g/dL


 


Urine Opiates Screen    (NotDetected)  


 


Ur Oxycodone Screen    (NotDetected)  


 


Urine Methadone Screen    (NotDetected)  


 


Ur Propoxyphene Screen    (NotDetected)  


 


Ur Barbiturates Screen    (NotDetected)  


 


U Tricyclic Antidepress    (NotDetected)  


 


Ur Phencyclidine Scrn    (NotDetected)  


 


Ur Amphetamines Screen    (NotDetected)  


 


U Methamphetamines Scrn    (NotDetected)  


 


U Benzodiazepines Scrn    (NotDetected)  


 


Urine Cocaine Screen    (NotDetected)  


 


U Marijuana (THC) Screen    (NotDetected)  














Disposition


Clinical Impression: 


 Palpitations





Disposition: HOME SELF-CARE


Condition: Good


Instructions (If sedation given, give patient instructions):  Heart Palpitations

(ED)


Additional Instructions: 


Patient should return to emergency department as any chest pain or worsening 

symptoms or shortness of breath.


Is patient prescribed a controlled substance at d/c from ED?: No


Referrals: 


Vick Camargo MD [Primary Care Provider] - 1-2 days


Time of Disposition: 19:34

## 2022-07-07 NOTE — XR
EXAMINATION TYPE: XR chest 2V

 

DATE OF EXAM: 7/7/2022

 

COMPARISON: 7/25/2021

 

HISTORY: Dysrhythmia

 

TECHNIQUE:  Frontal and lateral views of the chest are obtained.

 

FINDINGS:  There is no focal air space opacity, pleural effusion, or pneumothorax seen.  The cardiac 
silhouette size is within normal limits.   The osseous structures are intact.

 

IMPRESSION:  No acute cardiopulmonary process.

## 2024-11-19 ENCOUNTER — HOSPITAL ENCOUNTER (OUTPATIENT)
Dept: HOSPITAL 47 - OR | Age: 44
Discharge: HOME | End: 2024-11-19
Attending: SURGERY
Payer: COMMERCIAL

## 2024-11-19 VITALS — HEART RATE: 57 BPM | SYSTOLIC BLOOD PRESSURE: 120 MMHG | DIASTOLIC BLOOD PRESSURE: 77 MMHG

## 2024-11-19 VITALS — TEMPERATURE: 97.3 F

## 2024-11-19 VITALS — RESPIRATION RATE: 18 BRPM

## 2024-11-19 DIAGNOSIS — D17.0: Primary | ICD-10-CM

## 2024-11-19 DIAGNOSIS — E11.9: ICD-10-CM

## 2024-11-19 LAB — GLUCOSE BLD-MCNC: 87 MG/DL (ref 70–110)

## 2024-11-19 PROCEDURE — 21552 EXC NECK LES SC 3 CM/>: CPT

## 2024-11-19 PROCEDURE — 88304 TISSUE EXAM BY PATHOLOGIST: CPT

## 2024-11-19 PROCEDURE — 88307 TISSUE EXAM BY PATHOLOGIST: CPT

## 2024-11-19 RX ADMIN — ONDANSETRON ONE MG: 2 INJECTION INTRAMUSCULAR; INTRAVENOUS at 12:05

## 2024-11-19 RX ADMIN — POTASSIUM CHLORIDE ONE MLS: 14.9 INJECTION, SOLUTION INTRAVENOUS at 11:56

## 2024-11-19 RX ADMIN — DEXAMETHASONE SODIUM PHOSPHATE ONE MG: 4 INJECTION, SOLUTION INTRAMUSCULAR; INTRAVENOUS at 12:06

## 2024-11-19 RX ADMIN — POTASSIUM CHLORIDE SCH MLS/HR: 14.9 INJECTION, SOLUTION INTRAVENOUS at 12:13

## 2024-11-19 RX ADMIN — LIDOCAINE HYDROCHLORIDE AND EPINEPHRINE ONE ML: 10; 10 INJECTION, SOLUTION INFILTRATION; PERINEURAL at 12:49

## 2024-11-19 RX ADMIN — HEPARIN SODIUM STA UNIT: 5000 INJECTION, SOLUTION INTRAVENOUS; SUBCUTANEOUS at 12:24
